# Patient Record
Sex: FEMALE | Race: WHITE | NOT HISPANIC OR LATINO
[De-identification: names, ages, dates, MRNs, and addresses within clinical notes are randomized per-mention and may not be internally consistent; named-entity substitution may affect disease eponyms.]

---

## 2022-01-27 ENCOUNTER — TRANSCRIPTION ENCOUNTER (OUTPATIENT)
Age: 72
End: 2022-01-27

## 2022-01-27 ENCOUNTER — APPOINTMENT (OUTPATIENT)
Dept: VASCULAR SURGERY | Facility: CLINIC | Age: 72
End: 2022-01-27
Payer: MEDICARE

## 2022-01-27 PROBLEM — Z00.00 ENCOUNTER FOR PREVENTIVE HEALTH EXAMINATION: Status: ACTIVE | Noted: 2022-01-27

## 2022-01-27 PROCEDURE — 93971 EXTREMITY STUDY: CPT

## 2023-09-14 ENCOUNTER — EMERGENCY (EMERGENCY)
Facility: HOSPITAL | Age: 73
LOS: 1 days | Discharge: HOME | End: 2023-09-14
Attending: EMERGENCY MEDICINE | Admitting: EMERGENCY MEDICINE
Payer: MEDICARE

## 2023-09-14 VITALS
TEMPERATURE: 98 F | HEART RATE: 81 BPM | SYSTOLIC BLOOD PRESSURE: 114 MMHG | RESPIRATION RATE: 16 BRPM | OXYGEN SATURATION: 99 % | DIASTOLIC BLOOD PRESSURE: 80 MMHG

## 2023-09-14 PROCEDURE — 73552 X-RAY EXAM OF FEMUR 2/>: CPT | Mod: 26,RT

## 2023-09-14 PROCEDURE — 73562 X-RAY EXAM OF KNEE 3: CPT | Mod: 26,RT

## 2023-09-14 PROCEDURE — 73522 X-RAY EXAM HIPS BI 3-4 VIEWS: CPT | Mod: 26

## 2023-09-14 PROCEDURE — 73090 X-RAY EXAM OF FOREARM: CPT | Mod: 26,RT

## 2023-09-14 PROCEDURE — 99284 EMERGENCY DEPT VISIT MOD MDM: CPT | Mod: FS

## 2023-09-14 RX ORDER — ACETAMINOPHEN 500 MG
975 TABLET ORAL ONCE
Refills: 0 | Status: COMPLETED | OUTPATIENT
Start: 2023-09-14 | End: 2023-09-14

## 2023-09-14 RX ADMIN — Medication 975 MILLIGRAM(S): at 18:56

## 2023-09-14 NOTE — ED ADULT NURSE NOTE - NSFALLRISKINTERV_ED_ALL_ED

## 2023-09-14 NOTE — ED ADULT TRIAGE NOTE - CHIEF COMPLAINT QUOTE
Pt c/o R knee, hand and arm pain s/p trip and fall on the curb today. Bruising to R knee noted. No active bleeding. Pt denies hitting head.

## 2023-09-14 NOTE — ED PROVIDER NOTE - PATIENT PORTAL LINK FT
You can access the FollowMyHealth Patient Portal offered by [facility] by registering at the following website: [email or web address]. By joining [facility]’s FollowMyHealth portal, you will also be able to view your health information using other applications (apps) compatible with our system.

## 2023-09-14 NOTE — ED PROVIDER NOTE - OBJECTIVE STATEMENT
73F with pmh HTN p/w R hip, thigh, forearm pain s/p mechanical fall earlier today. Pt states she was moving luggage when she fell over an uneven step. No head injuries or LOC. Pt has not ambulated since the fall 2/2 pain. Denies any AC use, numbness/ paresthesias, urinary or bowel incontinence 73F with pmh HTN p/w R hip, thigh, forearm pain s/p mechanical fall earlier today. Pt states she was moving luggage when she fell over an uneven step. No head injuries or LOC. Pt has not ambulated since the fall 2/2 pain. Denies any AC use, numbness/ paresthesias, urinary or bowel incontinence    Attending/Lesvia: 74 yo F as described above, s/p mechanical fall landing on right side. Denies head trauma, LOC. Pt c/p Rt forearm, rt fore arm pain.. Denies HA, n/v, weakness or lightheadedness.

## 2023-09-14 NOTE — ED PROVIDER NOTE - CLINICAL SUMMARY MEDICAL DECISION MAKING FREE TEXT BOX
73F with pmh HTN p/w R hip, thigh, forearm pain s/p mechanical fall earlier today. No head injuries, no neuro deficits.   r/o fx vs muscle contusion     Plan:  - R femur, hip xr, R forearm, R knee xr  - tylenol

## 2023-09-14 NOTE — ED ADULT NURSE NOTE - OBJECTIVE STATEMENT
patient AOx3 came inc /o pain to right arm and right leg after trip and fell on the curb, states she was trying to step on to the curb and fell. denies LOC. denies use of blood thinners. denies past medical history. breathing even and unlabored. ambulates independently at baseline. bruises to right knee noted. skin warm and d ry. NAD. awaiting on radiology and re eval.

## 2023-09-14 NOTE — ED PROVIDER NOTE - NS ED ATTENDING STATEMENT MOD
This was a shared visit with the FLASH. I reviewed and verified the documentation and independently performed the documented:

## 2023-09-14 NOTE — ED PROVIDER NOTE - PROGRESS NOTE DETAILS
MD Nettie (PGY-2) MD Nettie (PGY-2) Received sign out on patient. In brief, 73-year-old female with past medical history of hypertension, presenting with right hip, thigh, forearm pain.  Patient received x-rays which did not show an acute fracture.  After signout patient was able to ambulate in the ED.  Will give patient a cane prior to discharge.  Discussed with pt results of work up, strict return precautions, and need for follow up.  Pt expressed understanding and agrees with plan.

## 2023-09-14 NOTE — ED PROVIDER NOTE - NSFOLLOWUPINSTRUCTIONS_ED_ALL_ED_FT
A contusion is a deep bruise. Contusions are the result of a blunt injury to tissues and muscle fibers under the skin. The injury causes bleeding under the skin. The skin over the contusion may turn blue, purple, or yellow. Minor injuries will give you a painless contusion, but more severe injuries cause contusions that can stay painful and swollen for a few weeks.    Follow these instructions at home:  Pay attention to any changes in your symptoms. Let your health care provider know about them. Take these actions to relieve your pain.    Managing pain, stiffness, and swelling    Bag of ice on a towel on the skin.  Use resting, icing, applying pressure (compression), and raising (elevating) the injured area. This is often called the RICE method.  Rest the injured area. Return to your normal activities as told by your health care provider. Ask your health care provider what activities are safe for you.  If directed, put ice on the injured area. To do this:  Put ice in a plastic bag.  Place a towel between your skin and the bag.  Leave the ice on for 20 minutes, 2–3 times a day.  If your skin turns bright red, remove the ice right away to prevent skin damage. The risk of skin damage is higher if you cannot feel pain, heat, or cold.  If directed, apply light compression to the injured area using an elastic bandage. Make sure the bandage is not wrapped too tightly. Remove and reapply the bandage as directed by your health care provider.  If possible, elevate the injured area above the level of your heart while you are sitting or lying down.  General instructions    Take over-the-counter and prescription medicines only as told by your health care provider.  Keep all follow-up visits. Your health care provider may want to see how your contusion is healing with treatment.  Contact a health care provider if:  Your symptoms do not improve after several days of treatment.  Your symptoms get worse.  You have difficulty moving the injured area.  Get help right away if:  You have severe pain.  You have numbness in a hand or foot.  Your hand or foot turns pale or cold.  This information is not intended to replace advice given to you by your health care provider. Make sure you discuss any questions you have with your health care provider.

## 2023-09-14 NOTE — ED PROVIDER NOTE - PHYSICAL EXAMINATION
constitutional: well appearing no acute distress, sitting comfortably   HEENT: head- normocephalic, atraumatic                 neck- full rom, no rigidity, no LAD  Cardiac: regular rate and rhythm, normal S1 S2 no murmurs rubs or gallops  Respiratory: able to speak in full sentences, no wheezing rales or rhonchi, lungs CTA b/l   Abd: Soft non tender non distended, no guarding, no palpable hernias or masses   Msk: 2x2 cm hematoma to R proximal forearm with tenderness, FROM to R shoulder/ R elbow/ R wrist and R hand all digits. Full passive ROM of RLE. No tenderness to b/l hip. + tenderness to R femur  Neuro: A x O x 4, face symmetric, cn 2-12 in tact, strength 5/5 in b/l ue and le, sensation grossly in tact to light touch, constitutional: well appearing no acute distress, sitting comfortably   HEENT: head- normocephalic, atraumatic                 neck- full rom, no rigidity, no LAD  Cardiac: regular rate and rhythm, normal S1 S2 no murmurs rubs or gallops  Respiratory: able to speak in full sentences, no wheezing rales or rhonchi, lungs CTA b/l   Abd: Soft non tender non distended, no guarding, no palpable hernias or masses   Msk: 2x2 cm hematoma to R proximal forearm with tenderness, FROM to R shoulder/ R elbow/ R wrist and R hand all digits. Full passive ROM of RLE. No tenderness to b/l hip. + tenderness to R femur  Neuro: A x O x 4, face symmetric, cn 2-12 in tact, strength 5/5 in b/l ue and le, sensation grossly in tact to light touch,    Attending/Lesvia: NAD, head-atraumatic; PERRL/EOMI, no cspine PT, supple, no JOSE MARTIN, no JVD, RRR, CTAB; Abd-soft, NT/ND, no HSM; no LE edema, A&Ox3, nonfocal; Rt forearm-small hematoma, no pain with supination/pronation; no wrist PT, +FROM; abrasions of MCP; Rt hip-+FROM, no PT; distal femur-small hematoma, no knee PT, no joint eff, +FROM

## 2024-08-21 NOTE — ED ADULT NURSE NOTE - HISTORY OF COVID-19 VACCINATION
April 5 patient had a ROBOT-ASSISTED SACROCOLPOPEXY WITH MESH  MIDURETHRAL SLING AND CYSTOSCOPY  PERINEORRHAPHY with Dr. Olivarez.  She was last seen 5/29, at which time she was treated with vaginal estrogen cream and Vesicare.    Patient's daughter states since Friday, patient has been having a burning pain by the right side suprapubic incision/scar.  Loli is not sure of specific concerns so a call is made directly to the patient via phone interpretor.    Today's visit was performed with the assistance of  Services.   Name of : Loreto [id number]   Service used: Phone : Third Party    Patient states since Friday, while patient was doing some cleaning at work.  At that time she had a pulling sensation and pain near the right sided suprapubic incision/scar site. Patient states the pain persists and it is tender to touch as well as deeper in the pelvis. The pain is constant, and is more bothersome when she is walking.  Patient states she is voiding without difficulty, and feels she is emptying her bladder. Patient is moving her bowels regularly with soft stool.  Patient denies a fever.      Patient has a follow up appointment with Dr. Olivarez next Thursday, but she is requesting an appointment sooner due to the pain. Advised heating pad to area, Ibuprofen every 8 hours as needed.  Will consult with Dr. Olivarez for any additional recommendations.             Yes